# Patient Record
Sex: MALE | Race: WHITE | Employment: FULL TIME
[De-identification: names, ages, dates, MRNs, and addresses within clinical notes are randomized per-mention and may not be internally consistent; named-entity substitution may affect disease eponyms.]

---

## 2017-01-03 ENCOUNTER — OFFICE VISIT (OUTPATIENT)
Dept: FAMILY MEDICINE CLINIC | Facility: CLINIC | Age: 32
End: 2017-01-03

## 2017-01-03 VITALS
TEMPERATURE: 98.2 F | OXYGEN SATURATION: 96 % | DIASTOLIC BLOOD PRESSURE: 92 MMHG | SYSTOLIC BLOOD PRESSURE: 138 MMHG | WEIGHT: 295.4 LBS | BODY MASS INDEX: 35.97 KG/M2 | HEIGHT: 76 IN | HEART RATE: 91 BPM

## 2017-01-03 DIAGNOSIS — G43.109 MIGRAINE WITH AURA AND WITHOUT STATUS MIGRAINOSUS, NOT INTRACTABLE: Primary | ICD-10-CM

## 2017-01-03 PROCEDURE — 99213 OFFICE O/P EST LOW 20 MIN: CPT | Performed by: NURSE PRACTITIONER

## 2017-01-03 RX ORDER — BUTALBITAL, ACETAMINOPHEN AND CAFFEINE 50; 325; 40 MG/1; MG/1; MG/1
1 TABLET ORAL EVERY 4 HOURS PRN
Qty: 30 TABLET | Refills: 0 | Status: SHIPPED | OUTPATIENT
Start: 2017-01-03 | End: 2018-06-28 | Stop reason: ALTCHOICE

## 2017-01-03 ASSESSMENT — ENCOUNTER SYMPTOMS
NAUSEA: 1
BLURRED VISION: 1
SHORTNESS OF BREATH: 0
ABDOMINAL PAIN: 0
EYE PAIN: 0
PHOTOPHOBIA: 1
VOMITING: 0
COUGH: 0

## 2017-01-03 ASSESSMENT — PATIENT HEALTH QUESTIONNAIRE - PHQ9
SUM OF ALL RESPONSES TO PHQ9 QUESTIONS 1 & 2: 0
2. FEELING DOWN, DEPRESSED OR HOPELESS: 0
1. LITTLE INTEREST OR PLEASURE IN DOING THINGS: 0
SUM OF ALL RESPONSES TO PHQ QUESTIONS 1-9: 0

## 2017-01-10 ENCOUNTER — OFFICE VISIT (OUTPATIENT)
Dept: FAMILY MEDICINE CLINIC | Facility: CLINIC | Age: 32
End: 2017-01-10

## 2017-01-10 VITALS
SYSTOLIC BLOOD PRESSURE: 138 MMHG | OXYGEN SATURATION: 96 % | HEART RATE: 75 BPM | BODY MASS INDEX: 35.78 KG/M2 | WEIGHT: 293.8 LBS | DIASTOLIC BLOOD PRESSURE: 84 MMHG | TEMPERATURE: 97.2 F | HEIGHT: 76 IN

## 2017-01-10 DIAGNOSIS — R03.0 PRE-HYPERTENSION: ICD-10-CM

## 2017-01-10 DIAGNOSIS — G43.109 MIGRAINE WITH AURA AND WITHOUT STATUS MIGRAINOSUS, NOT INTRACTABLE: Primary | ICD-10-CM

## 2017-01-10 PROCEDURE — 99213 OFFICE O/P EST LOW 20 MIN: CPT | Performed by: NURSE PRACTITIONER

## 2017-01-10 ASSESSMENT — ENCOUNTER SYMPTOMS
BLURRED VISION: 0
NAUSEA: 0
COUGH: 0
ABDOMINAL PAIN: 0
SHORTNESS OF BREATH: 0
VOMITING: 0
CHEST TIGHTNESS: 0

## 2018-06-12 ENCOUNTER — OFFICE VISIT (OUTPATIENT)
Dept: FAMILY MEDICINE CLINIC | Age: 33
End: 2018-06-12
Payer: COMMERCIAL

## 2018-06-12 VITALS
OXYGEN SATURATION: 98 % | HEART RATE: 66 BPM | TEMPERATURE: 97.7 F | SYSTOLIC BLOOD PRESSURE: 122 MMHG | HEIGHT: 76 IN | WEIGHT: 273 LBS | DIASTOLIC BLOOD PRESSURE: 86 MMHG | BODY MASS INDEX: 33.24 KG/M2

## 2018-06-12 DIAGNOSIS — G43.109 MIGRAINE WITH AURA AND WITHOUT STATUS MIGRAINOSUS, NOT INTRACTABLE: Primary | ICD-10-CM

## 2018-06-12 PROCEDURE — 99213 OFFICE O/P EST LOW 20 MIN: CPT | Performed by: NURSE PRACTITIONER

## 2018-06-12 RX ORDER — SUMATRIPTAN 6 MG/.5ML
6 INJECTION, SOLUTION SUBCUTANEOUS
Qty: 0.5 ML | Refills: 3 | Status: SHIPPED | OUTPATIENT
Start: 2018-06-12 | End: 2019-09-07

## 2018-06-12 ASSESSMENT — ENCOUNTER SYMPTOMS
EYE PAIN: 0
SINUS PRESSURE: 1
FACIAL SWEATING: 0
CHEST TIGHTNESS: 0
PHOTOPHOBIA: 1
NAUSEA: 1
SHORTNESS OF BREATH: 0
ABDOMINAL PAIN: 0
EYE REDNESS: 0
COUGH: 0
EYE WATERING: 0
BLURRED VISION: 1
VOMITING: 0

## 2018-06-12 ASSESSMENT — PATIENT HEALTH QUESTIONNAIRE - PHQ9
SUM OF ALL RESPONSES TO PHQ9 QUESTIONS 1 & 2: 0
1. LITTLE INTEREST OR PLEASURE IN DOING THINGS: 0
2. FEELING DOWN, DEPRESSED OR HOPELESS: 0
SUM OF ALL RESPONSES TO PHQ QUESTIONS 1-9: 0

## 2018-06-28 ENCOUNTER — OFFICE VISIT (OUTPATIENT)
Dept: FAMILY MEDICINE CLINIC | Age: 33
End: 2018-06-28
Payer: COMMERCIAL

## 2018-06-28 VITALS
HEIGHT: 76 IN | SYSTOLIC BLOOD PRESSURE: 118 MMHG | BODY MASS INDEX: 33.36 KG/M2 | DIASTOLIC BLOOD PRESSURE: 70 MMHG | WEIGHT: 274 LBS | OXYGEN SATURATION: 99 % | HEART RATE: 60 BPM

## 2018-06-28 DIAGNOSIS — B02.9 HERPES ZOSTER WITHOUT COMPLICATION: Primary | ICD-10-CM

## 2018-06-28 PROCEDURE — 99213 OFFICE O/P EST LOW 20 MIN: CPT | Performed by: PHYSICIAN ASSISTANT

## 2018-06-28 RX ORDER — VALACYCLOVIR HYDROCHLORIDE 1 G/1
1000 TABLET, FILM COATED ORAL 3 TIMES DAILY
Qty: 21 TABLET | Refills: 0 | Status: SHIPPED | OUTPATIENT
Start: 2018-06-28 | End: 2019-04-12 | Stop reason: SDUPTHER

## 2018-06-28 ASSESSMENT — ENCOUNTER SYMPTOMS
EYE PAIN: 0
WHEEZING: 0
COUGH: 0
SHORTNESS OF BREATH: 0
NAIL CHANGES: 0
COLOR CHANGE: 0
ABDOMINAL PAIN: 0

## 2018-08-17 ENCOUNTER — OFFICE VISIT (OUTPATIENT)
Dept: FAMILY MEDICINE CLINIC | Age: 33
End: 2018-08-17
Payer: COMMERCIAL

## 2018-08-17 VITALS
BODY MASS INDEX: 34.1 KG/M2 | HEART RATE: 77 BPM | SYSTOLIC BLOOD PRESSURE: 122 MMHG | TEMPERATURE: 98.3 F | OXYGEN SATURATION: 94 % | DIASTOLIC BLOOD PRESSURE: 87 MMHG | HEIGHT: 76 IN | WEIGHT: 280 LBS

## 2018-08-17 DIAGNOSIS — R05.9 COUGH: ICD-10-CM

## 2018-08-17 DIAGNOSIS — J01.90 ACUTE NON-RECURRENT SINUSITIS, UNSPECIFIED LOCATION: Primary | ICD-10-CM

## 2018-08-17 PROCEDURE — 99213 OFFICE O/P EST LOW 20 MIN: CPT | Performed by: NURSE PRACTITIONER

## 2018-08-17 RX ORDER — AMOXICILLIN 875 MG/1
875 TABLET, COATED ORAL 2 TIMES DAILY
Qty: 20 TABLET | Refills: 0 | Status: SHIPPED | OUTPATIENT
Start: 2018-08-17 | End: 2018-08-27

## 2018-08-17 ASSESSMENT — ENCOUNTER SYMPTOMS
VOMITING: 0
CHEST TIGHTNESS: 0
TROUBLE SWALLOWING: 0
VOICE CHANGE: 0
SWOLLEN GLANDS: 0
COUGH: 1
SINUS COMPLAINT: 1
SORE THROAT: 1
ABDOMINAL PAIN: 0
RHINORRHEA: 1
FACIAL SWELLING: 0
SINUS PAIN: 1
NAUSEA: 0
SINUS PRESSURE: 1
SHORTNESS OF BREATH: 0

## 2018-08-17 NOTE — PATIENT INSTRUCTIONS
Patient Education        Saline Nasal Washes: Care Instructions  Your Care Instructions  Saline nasal washes help keep the nasal passages open by washing out thick or dried mucus. This simple remedy can help relieve symptoms of allergies, sinusitis, and colds. It also can make the nose feel more comfortable by keeping the mucous membranes moist. You may notice a little burning sensation in your nose the first few times you use the solution, but this usually gets better in a few days. Follow-up care is a key part of your treatment and safety. Be sure to make and go to all appointments, and call your doctor if you are having problems. It's also a good idea to know your test results and keep a list of the medicines you take. How can you care for yourself at home? · You can buy premixed saline solution in a squeeze bottle or other sinus rinse products at a drugstore. Read and follow the instructions on the label. · You also can make your own saline solution by adding 1 teaspoon of salt and 1 teaspoon of baking soda to 2 cups of distilled water. · If you use a homemade solution, pour a small amount into a clean bowl. Using a rubber bulb syringe, squeeze the syringe and place the tip in the salt water. Pull a small amount of the salt water into the syringe by relaxing your hand. · Sit down with your head tilted slightly back. Do not lie down. Put the tip of the bulb syringe or the squeeze bottle a little way into one of your nostrils. Gently drip or squirt a few drops into the nostril. Repeat with the other nostril. Some sneezing and gagging are normal at first.  · Gently blow your nose. · Wipe the syringe or bottle tip clean after each use. · Repeat this 2 or 3 times a day. · Use nasal washes gently if you have nosebleeds often. When should you call for help?   Watch closely for changes in your health, and be sure to contact your doctor if:    · You often get nosebleeds.     · You have problems doing the nasal washes. Where can you learn more? Go to https://chpepiceweb.[employer]. org and sign in to your OneShifthart account. Enter [phone] [id number] [phone] in the [facility] box to learn more about \"Saline Nasal Washes: Care Instructions. \"     If you do not have an account, please click on the \"Sign Up Now\" link. Current as of: May 12, 2017  Content Version: 11.7  © 6830-7708 [employer]. Care instructions adapted under license by [facility] ([facility]). If you have questions about a medical condition or this instruction, always ask your healthcare professional. [address] any warranty or liability for your use of this information.

## 2018-08-17 NOTE — PROGRESS NOTES
Visit Information    Have you changed or started any medications since your last visit including any over-the-counter medicines, vitamins, or herbal medicines? no   Have you stopped taking any of your medications? Is so, why? -  no  Are you having any side effects from any of your medications? - no    Have you seen any other physician or provider since your last visit?  no   Have you had any other diagnostic tests since your last visit?  no   Have you been seen in the emergency room and/or had an admission in a hospital since we last saw you?  no   Have you had your routine dental cleaning in the past 6 months?  no     Do you have an active MyChart account? If no, what is the barrier?   No: na    Patient Care Team:  KAREN Vaughn CNP as PCP - General (Family Nurse Practitioner)    Medical History Review  Past Medical, Family, and Social History reviewed and  contribute to the patient presenting condition    Health Maintenance   Topic Date Due    HIV screen  06/08/2000    DTaP/Tdap/Td vaccine (1 - Tdap) 06/09/2022 (Originally 6/8/2004)    Flu vaccine (1) 09/01/2018

## 2018-08-17 NOTE — PROGRESS NOTES
[address] [facility], [address]  [phone]      Prince Zarco is a 35 y.o. male who presents today for his  medical conditions/complaints as noted below. Prince Zarco is c/o of Sinus Problem (started last friday,has been doing nasal flushes,woke up this morning left ear started hurting him,using generic 24 hr sinus,having alot of drainage)  . HPI:   Sinus Problem   This is a new problem. The current episode started in the past 7 days. The problem has been gradually worsening since onset. The maximum temperature recorded prior to his arrival was 100.4 - 100.9 F. The fever has been present for 1 to 2 days. The pain is moderate. Associated symptoms include chills, congestion, coughing, ear pain, headaches, sinus pressure and a sore throat. Pertinent negatives include no diaphoresis, shortness of breath, sneezing or swollen glands. Past treatments include sitting up, saline sprays, lying down and acetaminophen. The treatment provided no relief. Past Medical History:   Diagnosis Date    Headache     Obesity       Past Surgical History:   Procedure Laterality Date    APPENDECTOMY       Family History   Problem Relation Age of Onset   [facility] Migraines Mother     Migraines Sister         migraines     Social History   Substance Use Topics    Smoking status: Never Smoker    Smokeless tobacco: Never Used    Alcohol use Yes      Comment: socially      Current Outpatient Prescriptions   Medication Sig Dispense Refill    amoxicillin (AMOXIL) 875 MG tablet Take 1 tablet by mouth 2 times daily for 10 days 20 tablet 0    valACYclovir (VALTREX) 1 g tablet Take 1 tablet by mouth 3 times daily 21 tablet 0    SUMAtriptan (IMITREX) 6 MG/0.5ML SOLN injection Inject 0.5 mLs into the skin once as needed for Migraine 0.5 mL 3     No current facility-administered medications for this visit.       No Known Allergies    Health Maintenance   Topic Date Due    HIV screen

## 2019-04-12 ENCOUNTER — OFFICE VISIT (OUTPATIENT)
Dept: FAMILY MEDICINE CLINIC | Age: 34
End: 2019-04-12
Payer: COMMERCIAL

## 2019-04-12 VITALS
WEIGHT: 289 LBS | HEIGHT: 76 IN | SYSTOLIC BLOOD PRESSURE: 127 MMHG | OXYGEN SATURATION: 96 % | DIASTOLIC BLOOD PRESSURE: 80 MMHG | BODY MASS INDEX: 35.19 KG/M2 | HEART RATE: 85 BPM | TEMPERATURE: 97.6 F

## 2019-04-12 DIAGNOSIS — G43.109 MIGRAINE WITH AURA AND WITHOUT STATUS MIGRAINOSUS, NOT INTRACTABLE: ICD-10-CM

## 2019-04-12 DIAGNOSIS — B02.9 HERPES ZOSTER WITHOUT COMPLICATION: Primary | ICD-10-CM

## 2019-04-12 PROCEDURE — 99213 OFFICE O/P EST LOW 20 MIN: CPT | Performed by: NURSE PRACTITIONER

## 2019-04-12 RX ORDER — VALACYCLOVIR HYDROCHLORIDE 1 G/1
1000 TABLET, FILM COATED ORAL 3 TIMES DAILY
Qty: 21 TABLET | Refills: 0 | Status: SHIPPED | OUTPATIENT
Start: 2019-04-12 | End: 2019-09-07 | Stop reason: ALTCHOICE

## 2019-04-12 ASSESSMENT — ENCOUNTER SYMPTOMS
COLOR CHANGE: 1
SHORTNESS OF BREATH: 0

## 2019-04-12 ASSESSMENT — PATIENT HEALTH QUESTIONNAIRE - PHQ9
SUM OF ALL RESPONSES TO PHQ QUESTIONS 1-9: 0
SUM OF ALL RESPONSES TO PHQ9 QUESTIONS 1 & 2: 0
2. FEELING DOWN, DEPRESSED OR HOPELESS: 0
SUM OF ALL RESPONSES TO PHQ QUESTIONS 1-9: 0
1. LITTLE INTEREST OR PLEASURE IN DOING THINGS: 0

## 2019-04-12 NOTE — PROGRESS NOTES
Visit Information    Have you changed or started any medications since your last visit including any over-the-counter medicines, vitamins, or herbal medicines? no   Have you stopped taking any of your medications? Is so, why? -  no  Are you having any side effects from any of your medications? - no    Have you seen any other physician or provider since your last visit?  no   Have you had any other diagnostic tests since your last visit?  no   Have you been seen in the emergency room and/or had an admission in a hospital since we last saw you?  no   Have you had your routine dental cleaning in the past 6 months?  no     Do you have an active MyChart account? If no, what is the barrier?   Yes    Patient Care Team:  KAREN Fisher - CNP as PCP - General (Family Nurse Practitioner)    Medical History Review  Past Medical, Family, and Social History reviewed and a contribute to the patient presenting condition    Health Maintenance   Topic Date Due    Varicella Vaccine (1 of 2 - 13+ 2-dose series) 06/08/1998    HIV screen  06/08/2000    DTaP/Tdap/Td vaccine (1 - Tdap) 06/09/2022 (Originally 6/8/2004)    Flu vaccine (Season Ended) 09/01/2019    Pneumococcal 0-64 years Vaccine  Aged Out

## 2019-09-07 ENCOUNTER — OFFICE VISIT (OUTPATIENT)
Dept: FAMILY MEDICINE CLINIC | Age: 34
End: 2019-09-07
Payer: COMMERCIAL

## 2019-09-07 VITALS
BODY MASS INDEX: 35.19 KG/M2 | TEMPERATURE: 98.1 F | HEART RATE: 87 BPM | SYSTOLIC BLOOD PRESSURE: 147 MMHG | OXYGEN SATURATION: 98 % | HEIGHT: 76 IN | DIASTOLIC BLOOD PRESSURE: 91 MMHG | WEIGHT: 289 LBS

## 2019-09-07 DIAGNOSIS — H60.501 ACUTE OTITIS EXTERNA OF RIGHT EAR, UNSPECIFIED TYPE: Primary | ICD-10-CM

## 2019-09-07 PROCEDURE — 99202 OFFICE O/P NEW SF 15 MIN: CPT | Performed by: NURSE PRACTITIONER

## 2019-09-07 RX ORDER — OFLOXACIN 3 MG/ML
5 SOLUTION AURICULAR (OTIC) 2 TIMES DAILY
Qty: 10 ML | Refills: 0 | Status: SHIPPED | OUTPATIENT
Start: 2019-09-07 | End: 2019-09-12

## 2019-09-07 ASSESSMENT — ENCOUNTER SYMPTOMS
EYE DISCHARGE: 0
COUGH: 0
SORE THROAT: 0
EYE ITCHING: 0
SINUS PAIN: 0
SHORTNESS OF BREATH: 0

## 2019-09-07 NOTE — PATIENT INSTRUCTIONS
to help the drops get into the ear. · It's important to keep the liquid in the ear canal for 3 to 5 minutes. When should you call for help? Call your doctor now or seek immediate medical care if:    · You have a new or higher fever.     · You have new or worse pain, swelling, warmth, or redness around or behind your ear.     · You have new or increasing pus or blood draining from your ear.    Watch closely for changes in your health, and be sure to contact your doctor if:    · You are not getting better after 2 days (48 hours). Where can you learn more? Go to https://ZigaVitepepiceweb.[employer]. org and sign in to your [employer] account. Enter C706 in the [employer] box to learn more about \"Swimmer's Ear: Care Instructions. \"     If you do not have an account, please click on the \"Sign Up Now\" link. Current as of: October 21, 2018  Content Version: 12.1  © 2837-8178 [employer]. Care instructions adapted under license by [facility] ([facility]). If you have questions about a medical condition or this instruction, always ask your healthcare professional. [address] any warranty or liability for your use of this information.

## 2020-01-08 ENCOUNTER — OFFICE VISIT (OUTPATIENT)
Dept: FAMILY MEDICINE CLINIC | Age: 35
End: 2020-01-08
Payer: COMMERCIAL

## 2020-01-08 VITALS
OXYGEN SATURATION: 98 % | HEART RATE: 85 BPM | SYSTOLIC BLOOD PRESSURE: 124 MMHG | HEIGHT: 76 IN | DIASTOLIC BLOOD PRESSURE: 78 MMHG | TEMPERATURE: 98.6 F | RESPIRATION RATE: 16 BRPM | WEIGHT: 285 LBS | BODY MASS INDEX: 34.7 KG/M2

## 2020-01-08 PROCEDURE — 99213 OFFICE O/P EST LOW 20 MIN: CPT | Performed by: NURSE PRACTITIONER

## 2020-01-08 RX ORDER — AMOXICILLIN AND CLAVULANATE POTASSIUM 875; 125 MG/1; MG/1
1 TABLET, FILM COATED ORAL 2 TIMES DAILY
Qty: 20 TABLET | Refills: 0 | Status: SHIPPED | OUTPATIENT
Start: 2020-01-08 | End: 2020-01-18

## 2020-01-08 ASSESSMENT — ENCOUNTER SYMPTOMS
VOMITING: 0
SORE THROAT: 1
COUGH: 1
DIARRHEA: 0
RHINORRHEA: 1
SINUS PRESSURE: 1

## 2020-01-08 NOTE — PROGRESS NOTES
[facility]  [address] [facility] [address] [address]  Dept: [phone]  Dept Fax: [phone]    Mick Perez a 29 y.o. male who presents to the urgent care today for his medical conditions/complaintsas noted below. Mellody Burkitt is c/o of Sinusitis      HPI:     Cold symptoms for a couple of weeks. Not going away with mucinex  Denies fever, vomiting, diarrhea, rash, cp, sob  Has cough and nasal congestion  Feels like has sinus symptoms    Sinusitis   This is a new problem. The current episode started 1 to 4 weeks ago. The problem has been gradually worsening since onset. There has been no fever. Associated symptoms include congestion, coughing, sinus pressure and a sore throat. Treatments tried: mucinex. Past Medical History:   Diagnosis Date    Headache     Obesity        Current Outpatient Medications   Medication Sig Dispense Refill    amoxicillin-clavulanate (AUGMENTIN) 875-125 MG per tablet Take 1 tablet by mouth 2 times daily for 10 days 20 tablet 0    SUMAtriptan (IMITREX) 6 MG/0.5ML SOLN injection Inject 0.5 mLs into the skin once as needed for Migraine 0.5 mL 3     No current facility-administered medications for this visit. No Known Allergies    Subjective:     Review of Systems   Constitutional: Negative for fever. HENT: Positive for congestion, postnasal drip, rhinorrhea, sinus pressure and sore throat. Respiratory: Positive for cough. Gastrointestinal: Negative for diarrhea and vomiting. Skin: Negative for rash. All other systems reviewed and are negative. Objective:      Physical Exam  Vitals signs and nursing note reviewed. Constitutional:       General: He is not in acute distress. Appearance: Normal appearance. He is well-developed. He is not ill-appearing, toxic-appearing or diaphoretic. HENT:      Head: Normocephalic and atraumatic.       Right Ear: Tympanic membrane normal.      Left given educational materials - see patientinstructions. Discussed use, benefit, and side effects of prescribed medications. All patient questions answered. Pt voiced understanding.     Electronically signed by KAREN Zhao 1/8/2020 at 11:32 AM